# Patient Record
Sex: MALE | URBAN - METROPOLITAN AREA
[De-identification: names, ages, dates, MRNs, and addresses within clinical notes are randomized per-mention and may not be internally consistent; named-entity substitution may affect disease eponyms.]

---

## 2019-08-21 ENCOUNTER — Encounter (OUTPATIENT)
Dept: URBAN - METROPOLITAN AREA CLINIC 71 | Facility: CLINIC | Age: 64
End: 2019-08-21
Payer: MEDICARE

## 2019-08-21 PROCEDURE — 92004 COMPRE OPH EXAM NEW PT 1/>: CPT | Performed by: OPHTHALMOLOGY

## 2020-01-21 ENCOUNTER — Encounter (OUTPATIENT)
Dept: URBAN - METROPOLITAN AREA CLINIC 72 | Facility: CLINIC | Age: 65
End: 2020-01-21
Payer: MEDICARE

## 2020-01-21 PROCEDURE — 92014 COMPRE OPH EXAM EST PT 1/>: CPT | Performed by: OPTOMETRIST

## 2020-08-04 ENCOUNTER — OFFICE VISIT (OUTPATIENT)
Dept: URBAN - METROPOLITAN AREA CLINIC 72 | Facility: CLINIC | Age: 65
End: 2020-08-04
Payer: MEDICARE

## 2020-08-04 DIAGNOSIS — H52.4 PRESBYOPIA: ICD-10-CM

## 2020-08-04 DIAGNOSIS — H35.341 MACULAR CYST, HOLE, OR PSEUDOHOLE, RIGHT EYE: ICD-10-CM

## 2020-08-04 PROCEDURE — 92014 COMPRE OPH EXAM EST PT 1/>: CPT | Performed by: OPTOMETRIST

## 2020-08-04 PROCEDURE — 92134 CPTRZ OPH DX IMG PST SGM RTA: CPT | Performed by: OPTOMETRIST

## 2020-08-04 PROCEDURE — 92133 CPTRZD OPH DX IMG PST SGM ON: CPT | Performed by: OPTOMETRIST

## 2020-08-04 RX ORDER — ATORVASTATIN CALCIUM 40 MG/1
40 MG TABLET, FILM COATED ORAL
Qty: 0 | Refills: 0 | Status: ACTIVE
Start: 2020-08-04

## 2020-08-04 RX ORDER — WARFARIN SODIUM 2 MG/1
2 MG TABLET ORAL
Qty: 0 | Refills: 0 | Status: ACTIVE
Start: 2020-08-04

## 2020-08-04 RX ORDER — VORTIOXETINE 20 MG/1
20 MG TABLET, FILM COATED ORAL
Qty: 0 | Refills: 0 | Status: ACTIVE
Start: 2020-08-04

## 2020-08-04 RX ORDER — LOSARTAN POTASSIUM 25 MG/1
25 MG TABLET ORAL
Qty: 0 | Refills: 0 | Status: ACTIVE
Start: 2020-08-04

## 2020-08-04 RX ORDER — METOPROLOL SUCCINATE 50 MG/1
50 MG TABLET, FILM COATED, EXTENDED RELEASE ORAL
Qty: 0 | Refills: 0 | Status: ACTIVE
Start: 2020-08-04

## 2020-08-04 RX ORDER — LAMOTRIGINE 25 MG/1
25 MG TABLET ORAL
Qty: 0 | Refills: 0 | Status: ACTIVE
Start: 2020-08-04

## 2020-08-04 RX ORDER — BUPRENORPHINE HYDROCHLORIDE, NALOXONE HYDROCHLORIDE 8; 2 MG/1; MG/1
FILM, SOLUBLE BUCCAL; SUBLINGUAL
Qty: 0 | Refills: 0 | Status: ACTIVE
Start: 2020-08-04

## 2020-08-04 ASSESSMENT — VISUAL ACUITY
OD: 20/20
OS: 20/20

## 2020-08-04 NOTE — IMPRESSION/PLAN
Impression: Macular cyst, hole, or pseudohole, right eye: H35.341. Plan: OD: Discussed diagnosis in detail with patient. No treatment is required at this time. likely cause of mild intermittent diplopia OD.  recommend patient return in 4 months for DFE w/ OCT R/C macular Cyst OD

## 2020-10-29 ENCOUNTER — OFFICE VISIT (OUTPATIENT)
Dept: URBAN - METROPOLITAN AREA CLINIC 71 | Facility: CLINIC | Age: 65
End: 2020-10-29
Payer: MEDICARE

## 2020-10-29 DIAGNOSIS — H00.12 CHALAZION RIGHT LOWER EYELID: Primary | ICD-10-CM

## 2020-10-29 PROCEDURE — 67800 REMOVE EYELID LESION: CPT | Performed by: OPHTHALMOLOGY

## 2020-10-29 RX ORDER — NEOMYCIN, POLYMYXIN B SULFATES, DEXAMETHASONE 1; 3.5; 1 MG/G; MG/G; [USP'U]/G
OINTMENT OPHTHALMIC
Qty: 1 | Refills: 0 | Status: INACTIVE
Start: 2020-10-29 | End: 2021-08-02

## 2020-10-29 ASSESSMENT — INTRAOCULAR PRESSURE
OS: 18
OD: 17

## 2020-10-29 NOTE — IMPRESSION/PLAN
Impression: Chalazion right lower eyelid: H00.12. Plan: Discussed diagnosis in detail with patient. Discussed treatment options with patient. Pt elects to have chalazion incised and drained today. Site was prepped with lidocaine ointment and cleaned with alcohol. Eye was numbed using proparacaine. Lid was injected with lidocaine w/ epi. Chalazion clamp was used, incision was made using 15 blade, and contents drained and cleared using curette without complications. Pt tolerated procedure well. Begin hussain-poly-dex sveta on the outside of lid for 1 week Contact office with any issues or recurrence.

## 2021-03-12 ENCOUNTER — OFFICE VISIT (OUTPATIENT)
Dept: URBAN - METROPOLITAN AREA CLINIC 71 | Facility: CLINIC | Age: 66
End: 2021-03-12
Payer: MEDICARE

## 2021-03-12 PROCEDURE — 99212 OFFICE O/P EST SF 10 MIN: CPT | Performed by: OPHTHALMOLOGY

## 2021-03-12 ASSESSMENT — INTRAOCULAR PRESSURE
OD: 19
OS: 19

## 2021-06-28 ENCOUNTER — OFFICE VISIT (OUTPATIENT)
Dept: URBAN - METROPOLITAN AREA CLINIC 71 | Facility: CLINIC | Age: 66
End: 2021-06-28
Payer: MEDICARE

## 2021-06-28 DIAGNOSIS — H25.13 AGE-RELATED NUCLEAR CATARACT, BILATERAL: ICD-10-CM

## 2021-06-28 DIAGNOSIS — H53.2 DOUBLE VISION: Primary | ICD-10-CM

## 2021-06-28 PROCEDURE — 99212 OFFICE O/P EST SF 10 MIN: CPT | Performed by: OPHTHALMOLOGY

## 2021-06-28 ASSESSMENT — INTRAOCULAR PRESSURE
OD: 15
OS: 17

## 2021-06-28 ASSESSMENT — KERATOMETRY
OS: 45.38
OD: 45.25

## 2021-06-28 NOTE — IMPRESSION/PLAN
Impression: Double vision: H53.2. Decompensating exophoria worse at near, but still present slightly at distance. Discussed option of patient having glasses with prism, but pt states the double vision is not constant. If the doubling becomes more frequent then the prism would be recommended. Plan: Patient to call if doubling worsens and then he can schedule with an optometrist for a refraction with possible prism.

## 2021-08-02 ENCOUNTER — OFFICE VISIT (OUTPATIENT)
Dept: URBAN - METROPOLITAN AREA CLINIC 71 | Facility: CLINIC | Age: 66
End: 2021-08-02
Payer: MEDICARE

## 2021-08-02 DIAGNOSIS — H43.813 VITREOUS DEGENERATION, BILATERAL: ICD-10-CM

## 2021-08-02 PROCEDURE — 99214 OFFICE O/P EST MOD 30 MIN: CPT | Performed by: OPTOMETRIST

## 2021-08-02 ASSESSMENT — INTRAOCULAR PRESSURE
OS: 16
OD: 14

## 2021-08-02 ASSESSMENT — VISUAL ACUITY
OD: 20/20
OS: 20/20

## 2021-08-02 NOTE — IMPRESSION/PLAN
Impression: Diplopia: H53.2. Dr. Derick Pryor suspected decompensating phorias but nothing significant found today. <1 R hyper w/2 compensation, 2 exo w/14+ compensation. Pt reports friend told him his OD was turning in for 5-10 min. Suspect cerebrovascular events instead. Pt reports recent brain MRI showed nothing abnormal. Pt reports no Diabetes. Uncontrolled HTN was approx similar time as diplopia. Since BP is controlled and not having anymore episodes of diplopia. Plan: Discussed with pt. Prism not warranted in new glasses Rx. Discussed consult with PCP or Cardiologist for evaluation of possible TIA/CVA. Call if symptoms worsen. Continue to monitor BP closely. Call if vision worsens.

## 2021-08-02 NOTE — IMPRESSION/PLAN
Impression: Presbyopia: H52.4. Plan: A glasses prescription has been discussed and generated. Due to pt's ability to compensate, no prism put into new glasses Rx. Patient to call with any concerns.

## 2021-08-02 NOTE — IMPRESSION/PLAN
Impression: Vitreous degeneration, bilateral: H43.813. Pt deferred dilation today. Plan: Continue to monitor with yearly DE. Discussed importance of dilated exams.

## 2022-09-26 ENCOUNTER — OFFICE VISIT (OUTPATIENT)
Dept: URBAN - METROPOLITAN AREA CLINIC 71 | Facility: CLINIC | Age: 67
End: 2022-09-26
Payer: MEDICARE

## 2022-09-26 DIAGNOSIS — D31.91 BENIGN NEOPLASM OF UNSPECIFIED PART OF RIGHT EYE: Primary | ICD-10-CM

## 2022-09-26 DIAGNOSIS — H25.13 NUCLEAR SCLEROSIS CATARACT, BILATERAL: ICD-10-CM

## 2022-09-26 PROCEDURE — 92012 INTRM OPH EXAM EST PATIENT: CPT | Performed by: OPTOMETRIST

## 2022-09-26 ASSESSMENT — INTRAOCULAR PRESSURE
OD: 14
OS: 15

## 2022-09-26 NOTE — IMPRESSION/PLAN
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 Can consider referral to specialist for further evaluation and possible treatment if becomes problematic. Pt declined at this time. Continue to monitor. Call if worsens.

## 2022-09-26 NOTE — IMPRESSION/PLAN
Impression: Nuclear sclerosis cataract, bilateral: H25.13. Plan: Continue to monitor. Keep appt as scheduled for [place] 10/21 w/Dr. Carlos Bah.

## 2022-10-21 ENCOUNTER — OFFICE VISIT (OUTPATIENT)
Dept: URBAN - METROPOLITAN AREA CLINIC 71 | Facility: CLINIC | Age: 67
End: 2022-10-21
Payer: MEDICARE

## 2022-10-21 DIAGNOSIS — H33.322 ROUND HOLE OF RETINA OF LT EYE: ICD-10-CM

## 2022-10-21 DIAGNOSIS — H35.3131 NONEXUDATIVE AGE-RELATED MACULAR DEGENERATION, BILATERAL, EARLY DRY STAGE: ICD-10-CM

## 2022-10-21 DIAGNOSIS — H52.4 PRESBYOPIA: ICD-10-CM

## 2022-10-21 DIAGNOSIS — H43.813 VITREOUS DEGENERATION, BILATERAL: ICD-10-CM

## 2022-10-21 DIAGNOSIS — H25.13 NUCLEAR SCLEROSIS CATARACT, BILATERAL: Primary | ICD-10-CM

## 2022-10-21 PROCEDURE — 99214 OFFICE O/P EST MOD 30 MIN: CPT | Performed by: OPTOMETRIST

## 2022-10-21 PROCEDURE — 92134 CPTRZ OPH DX IMG PST SGM RTA: CPT | Performed by: OPTOMETRIST

## 2022-10-21 ASSESSMENT — KERATOMETRY
OS: 45.50
OD: 45.38

## 2022-10-21 ASSESSMENT — VISUAL ACUITY
OS: 20/20
OD: 20/20

## 2022-10-21 ASSESSMENT — INTRAOCULAR PRESSURE
OS: 14
OD: 13

## 2022-10-21 NOTE — IMPRESSION/PLAN
Impression: Nonexudative age-related macular degeneration, bilateral, early dry stage: H35.3131. +FEH. OCT mac 10/21/22: No SRF/IRF OU. Plan: OCT mac ordered and performed today. Discussed condition in detail. AMD and AREDS education given. Recommend [facility] Advanced Dry Eye Formula Supplements. Request sent to [facility] pharmacy. [place]msler grid given and explained how and when to use. Call if symptoms worsen. Will continue to monitor with DE/OCT yearly.

## 2022-10-21 NOTE — IMPRESSION/PLAN
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 inferiorly Plan: Discussed findings with pt. Refer to retina specialist next available for further evaluation and possible treatment. Discussed s/s of retinal detachment. Pt to call with any new symptoms or concerns.

## 2022-11-24 NOTE — IMPRESSION/PLAN
Impression: Age-related nuclear cataract, bilateral: H25.13. Stable. Plan: Monitor.
Impression: Double vision: H53.2. No stroke of nerve seen today. Cause of double vision could be related to the prolapse of fat on the orbit. Discussed with patient. Plan: If worsening occurs, patient is instructed to call in. No treatment needed at this time.
Yes

## 2023-03-31 ENCOUNTER — OFFICE VISIT (OUTPATIENT)
Dept: URBAN - METROPOLITAN AREA CLINIC 71 | Facility: CLINIC | Age: 68
End: 2023-03-31
Payer: MEDICARE

## 2023-03-31 DIAGNOSIS — H04.123 DRY EYE SYNDROME OF BILATERAL LACRIMAL GLANDS: Primary | ICD-10-CM

## 2023-03-31 DIAGNOSIS — H35.3131 NONEXUDATIVE AGE-RELATED MACULAR DEGENERATION, BILATERAL, EARLY DRY STAGE: ICD-10-CM

## 2023-03-31 PROCEDURE — 99212 OFFICE O/P EST SF 10 MIN: CPT

## 2023-03-31 ASSESSMENT — INTRAOCULAR PRESSURE
OS: 11
OD: 10

## 2023-03-31 NOTE — IMPRESSION/PLAN
Impression: Dry eye syndrome of bilateral lacrimal glands: H04.123. Plan: Discussed consistent lubrication with artificial tears 4-6x a day as well as a PM ointment or GEL drop. Recommend continuing Pred BID OU for 1-2 weeks then d/c. Pt to call if any flare ups occur.

## 2023-03-31 NOTE — IMPRESSION/PLAN
Impression: Nonexudative age-related macular degeneration, bilateral, early dry stage: H35.3131. Plan: Re-Explained to pt that there is no treatment for Dry AMD, only a supplement that may help slow the progression of it. Educated pt on AREDS. Due to stage of AMD recommend pt to continue with supplement. Recommend DFE and OCT every 6 months

## 2023-11-30 ENCOUNTER — OFFICE VISIT (OUTPATIENT)
Dept: URBAN - METROPOLITAN AREA CLINIC 71 | Facility: CLINIC | Age: 68
End: 2023-11-30
Payer: MEDICARE

## 2023-11-30 DIAGNOSIS — H33.322 ROUND HOLE OF RETINA OF LT EYE: ICD-10-CM

## 2023-11-30 DIAGNOSIS — H52.4 PRESBYOPIA: ICD-10-CM

## 2023-11-30 DIAGNOSIS — H43.813 VITREOUS DEGENERATION, BILATERAL: ICD-10-CM

## 2023-11-30 DIAGNOSIS — H35.3131 NONEXUDATIVE AGE-RELATED MACULAR DEGENERATION, BILATERAL, EARLY DRY STAGE: Primary | ICD-10-CM

## 2023-11-30 DIAGNOSIS — H25.13 NUCLEAR SCLEROSIS CATARACT, BILATERAL: ICD-10-CM

## 2023-11-30 PROCEDURE — 92134 CPTRZ OPH DX IMG PST SGM RTA: CPT | Performed by: OPTOMETRIST

## 2023-11-30 PROCEDURE — 92014 COMPRE OPH EXAM EST PT 1/>: CPT | Performed by: OPTOMETRIST

## 2023-11-30 ASSESSMENT — INTRAOCULAR PRESSURE
OD: 12
OS: 10

## 2023-11-30 ASSESSMENT — VISUAL ACUITY
OD: 20/20
OS: 20/25

## 2025-01-09 ENCOUNTER — OFFICE VISIT (OUTPATIENT)
Dept: URBAN - METROPOLITAN AREA CLINIC 71 | Facility: CLINIC | Age: 70
End: 2025-01-09
Payer: MEDICARE

## 2025-01-09 DIAGNOSIS — H43.813 VITREOUS DEGENERATION, BILATERAL: ICD-10-CM

## 2025-01-09 DIAGNOSIS — H35.363 DRUSEN (DEGENERATIVE) OF MACULA, BILATERAL: Primary | ICD-10-CM

## 2025-01-09 DIAGNOSIS — H35.3131 NONEXUDATIVE AGE-RELATED MACULAR DEGENERATION, BILATERAL, EARLY DRY STAGE: ICD-10-CM

## 2025-01-09 DIAGNOSIS — H52.4 PRESBYOPIA: ICD-10-CM

## 2025-01-09 DIAGNOSIS — H33.322 ROUND HOLE OF RETINA OF LT EYE: ICD-10-CM

## 2025-01-09 PROCEDURE — 92134 CPTRZ OPH DX IMG PST SGM RTA: CPT

## 2025-01-09 PROCEDURE — 92133 CPTRZD OPH DX IMG PST SGM ON: CPT

## 2025-01-09 PROCEDURE — 99213 OFFICE O/P EST LOW 20 MIN: CPT

## 2025-01-09 ASSESSMENT — VISUAL ACUITY
OS: 20/30
OD: 20/20

## 2025-01-09 ASSESSMENT — INTRAOCULAR PRESSURE
OS: 15
OD: 16